# Patient Record
Sex: MALE | Race: BLACK OR AFRICAN AMERICAN | NOT HISPANIC OR LATINO
[De-identification: names, ages, dates, MRNs, and addresses within clinical notes are randomized per-mention and may not be internally consistent; named-entity substitution may affect disease eponyms.]

---

## 2022-06-21 PROBLEM — Z00.00 ENCOUNTER FOR PREVENTIVE HEALTH EXAMINATION: Status: ACTIVE | Noted: 2022-06-21

## 2022-06-22 ENCOUNTER — APPOINTMENT (OUTPATIENT)
Dept: ORTHOPEDIC SURGERY | Facility: CLINIC | Age: 42
End: 2022-06-22
Payer: COMMERCIAL

## 2022-06-22 VITALS — BODY MASS INDEX: 23.1 KG/M2 | HEIGHT: 71 IN | WEIGHT: 165 LBS

## 2022-06-22 DIAGNOSIS — Z78.9 OTHER SPECIFIED HEALTH STATUS: ICD-10-CM

## 2022-06-22 PROCEDURE — 73140 X-RAY EXAM OF FINGER(S): CPT | Mod: RT

## 2022-06-22 PROCEDURE — 99204 OFFICE O/P NEW MOD 45 MIN: CPT

## 2022-06-22 NOTE — IMAGING
[de-identified] : RIGHT HAND\par skin intact. moderate swelling of SF DIPJ.\par TTP to SF DIPJ.\par SF: DIPJ ext lag, otherwise good ext. mild stiffness with PIPJ flex.\par good flex/ext other digits. \par SILT to median, ulnar, radial distribution. \par brisk cap refill all digits.\par no triggering. [Right] : right fingers [FreeTextEntry9] : small: displaced fx of dorsal base of distal phalanx. DIPJ volar subluxation.

## 2022-06-22 NOTE — ASSESSMENT
[FreeTextEntry1] : The condition was explained to the patient. Discussed risks and benefits of surgical vs non-surgical treatment. He would like to proceed with non-surgical treatment.\par Discussed that there will be a permanent bump/deformity at the fracture site. Discussed risk of post-traumatic arthritis, which can cause pain, stiffness, weakness.\par Discussed possible outcomes - extension to neutral, no improvement in extensor lag, or partial improvement in extensor lag (most likely). Discussed risk of skin irritation, breakdown, and sensitivity as well as joint stiffness and loss of ROM due to prolonged immobilization.\par - Applied stack splint (size 5). Anticipate 6wks full time, 4wks part time. Reviewed splint care and hygiene tips. Demonstrated PIPJ flexion exercises to reduce adjacent joint stiffness.\par - Light activity.\par - Recommend OTC pain medications such as Tylenol and NSAIDs as needed, provided there are no contra-indicated medical conditions (eg liver disease, kidney disease, or GI ulcer/bleeding) or medications (eg blood thinners). Discussed possible GI and blood pressure side effects.\par \par F/u 2wks. X-rays R small finger.

## 2022-06-22 NOTE — HISTORY OF PRESENT ILLNESS
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 He has noticed improvement in drooping of finger with just a few days of splinting.\par \par Hx: none. [] : Post Surgical Visit: no [FreeTextEntry1] : Rt pinky [FreeTextEntry5] : patient slammed a door on his pinky 3 weeks ago\par  [de-identified] : movement

## 2022-07-07 ENCOUNTER — APPOINTMENT (OUTPATIENT)
Dept: ORTHOPEDIC SURGERY | Facility: CLINIC | Age: 42
End: 2022-07-07

## 2022-07-07 DIAGNOSIS — S62.636A DISPLACED FRACTURE OF DISTAL PHALANX OF RIGHT LITTLE FINGER, INITIAL ENCOUNTER FOR CLOSED FRACTURE: ICD-10-CM

## 2022-07-07 PROCEDURE — 73140 X-RAY EXAM OF FINGER(S): CPT | Mod: RT

## 2022-07-07 PROCEDURE — 99213 OFFICE O/P EST LOW 20 MIN: CPT

## 2022-07-07 NOTE — IMAGING
[de-identified] : RIGHT HAND\par skin intact. moderate swelling of SF DIPJ.\par TTP to SF DIPJ.\par SF: DIPJ ext lag. good PIP flex/ext.\par good flex/ext other digits. \par SILT to median, ulnar, radial distribution. \par brisk cap refill all digits.\par no triggering. [Right] : right fingers [FreeTextEntry9] : small: displaced fx of dorsal base of distal phalanx. DIPJ volar subluxation.

## 2022-07-07 NOTE — ASSESSMENT
[FreeTextEntry1] : - continue stack splint (size 3). Reviewed splint care and hygiene tips. Demonstrated PIPJ flexion exercises to reduce adjacent joint stiffness.\par - Light activity.\par \par F/u 4wks. X-rays R small finger in splint. Statement Selected

## 2022-07-07 NOTE — HISTORY OF PRESENT ILLNESS
[4] : 4 [1] : 2 [de-identified] : 7/7/22: f/u RIGHT small finger mallet fx. 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 He has noticed improvement in drooping of finger with just a few days of splinting.\par \par Hx: none. [FreeTextEntry1] : rt pinky  [de-identified] : none

## 2022-08-04 ENCOUNTER — APPOINTMENT (OUTPATIENT)
Dept: ORTHOPEDIC SURGERY | Facility: CLINIC | Age: 42
End: 2022-08-04

## 2022-10-30 ENCOUNTER — EMERGENCY (EMERGENCY)
Facility: HOSPITAL | Age: 42
LOS: 0 days | Discharge: HOME | End: 2022-10-31
Attending: STUDENT IN AN ORGANIZED HEALTH CARE EDUCATION/TRAINING PROGRAM
Payer: COMMERCIAL

## 2022-10-30 VITALS
TEMPERATURE: 98 F | DIASTOLIC BLOOD PRESSURE: 61 MMHG | HEART RATE: 72 BPM | RESPIRATION RATE: 18 BRPM | WEIGHT: 169.98 LBS | HEIGHT: 71 IN | OXYGEN SATURATION: 98 % | SYSTOLIC BLOOD PRESSURE: 103 MMHG

## 2022-10-30 VITALS
HEART RATE: 85 BPM | DIASTOLIC BLOOD PRESSURE: 73 MMHG | SYSTOLIC BLOOD PRESSURE: 110 MMHG | RESPIRATION RATE: 18 BRPM | OXYGEN SATURATION: 97 % | TEMPERATURE: 98 F

## 2022-10-30 DIAGNOSIS — S69.92XA UNSPECIFIED INJURY OF LEFT WRIST, HAND AND FINGER(S), INITIAL ENCOUNTER: ICD-10-CM
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
## 2022-10-30 DIAGNOSIS — S62.623A DISPLACED FRACTURE OF MIDDLE PHALANX OF LEFT MIDDLE FINGER, INITIAL ENCOUNTER FOR CLOSED FRACTURE: ICD-10-CM
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
## 2022-10-30 PROCEDURE — 29130 APPL FINGER SPLINT STATIC: CPT

## 2022-10-30 PROCEDURE — 99284 EMERGENCY DEPT VISIT MOD MDM: CPT | Mod: 25

## 2022-10-30 NOTE — ED ADULT TRIAGE NOTE - CHIEF COMPLAINT QUOTE
Patient fell onto his right hand yesterday injuring the 3rd finger on right hand. Denies hitting head. No pmh.

## 2022-10-31 PROCEDURE — 73130 X-RAY EXAM OF HAND: CPT | Mod: 26,LT

## 2022-10-31 NOTE — ED ADULT NURSE NOTE - OBJECTIVE STATEMENT
Received pt in the ED from triage, aox3. C/o L hand pain . Pt endorsed he slipped and felt landing on left hand/ finger today. Pt denied LOC, head strike.  Pt noted with mild swelling on 3rd digit with some pain. No other injury noted. Speaks full sentences, unlabored breathing on RA. Able to CANTU.

## 2022-10-31 NOTE — ED PROVIDER NOTE - OBJECTIVE STATEMENT
43yo male with no pmh presenting with finger injury.  Patient slipped and felt landing on left hand/ finger.  No head strike/ loc.  No pain or injury elsewhere.  Happened earlier today.  No numbness, weakness.  Has some swelling 3rd digit with pain and no skin break.

## 2022-10-31 NOTE — ED ADULT NURSE NOTE - PRO INTERPRETER NEED 2
BLOOD PRESSURE LOG FOR: Karen Stiles Austen  Goal /80 or less.  Don't panic if 140/90.     DATE/TIME  AM WEIGHT BLOOD  PRESSURE PULSE TIME  PM BLOOD  PRESSURE   PULSE                                                                                                                                                                                                                                        Current Blood Pressure Medications: (dose and frequency)    MEDICATION DOSE FREQUENCY   Lisinopril / hctz 20/12.5 daily                    Please vitor any medication change (increase/decrease/new med) with a *.   English

## 2022-10-31 NOTE — ED PROVIDER NOTE - NSFOLLOWUPINSTRUCTIONS_ED_ALL_ED_FT
Follow up with hand specialist - keep splint in place  Rest, drink plenty of fluids  Advance activity as tolerated  Continue all previously prescribed medications as directed  Follow up with your PMD - bring copies of your results  Return to the ER for severe pain, numbness, weakness, or other new or concerning symptoms

## 2022-10-31 NOTE — ED PROVIDER NOTE - PHYSICAL EXAMINATION
General appearance: Nontoxic appearing, conversant, afebrile    HENT: Atraumatic; anicteric sclerae, CLIFF, EOMI   Neck: Trachea midline; Full range of motion, supple   Pulm: normal respiratory effort and no intercostal retractions, normal work of breathing   Extremities: No peripheral edema, no gross deformities, FROM x4, 5/5 MS x4, gross sensation intact, L middle finger edema ttp, flexion/extension intact   Skin: Dry, normal temperature, turgor and texture; no rash   Psych: Appropriate affect, cooperative

## 2022-10-31 NOTE — ED PROVIDER NOTE - CLINICAL SUMMARY MEDICAL DECISION MAKING FREE TEXT BOX
Presenting with finger injury.  No obvious deformities but with edema.  Will XR eval for fx, likely splint, dc.

## 2022-10-31 NOTE — ED PROVIDER NOTE - CARE PROVIDER_API CALL
Avril Montes)  Orthopaedic Surgery; Surgery of the Hand  [address] [place] [address]  Phone: [phone]  Fax: [phone]  Follow Up Time:     Anthony Mcdonnell)  Plastic Surgery  [address] [place] [address], [address]  [place], [place] [id number]  Phone: [phone]  Fax: [phone]  Follow Up Time:

## 2022-10-31 NOTE — ED ADULT NURSE NOTE - NS PRO PASSIVE SMOKE EXP
No Stroke Warning Signs and Symptoms/Prescribed Medications/Call 911 for Stroke/Stroke Education Booklet/Risk Factors for Stroke/Need for Followup After Discharge

## 2022-10-31 NOTE — ED PROVIDER NOTE - PATIENT PORTAL LINK FT
You can access the FollowMyHealth Patient Portal offered by [facility] by registering at the following website: [email or web address]. By joining [facility]’s FollowMyHealth portal, you will also be able to view your health information using other applications (apps) compatible with our system.

## 2022-11-08 ENCOUNTER — APPOINTMENT (OUTPATIENT)
Dept: ORTHOPEDIC SURGERY | Facility: CLINIC | Age: 42
End: 2022-11-08
Payer: COMMERCIAL

## 2022-11-08 VITALS — BODY MASS INDEX: 23.1 KG/M2 | WEIGHT: 165 LBS | HEIGHT: 71 IN

## 2022-11-08 DIAGNOSIS — S62.623A DISPLACED FRACTURE OF MIDDLE PHALANX OF LEFT MIDDLE FINGER, INITIAL ENCOUNTER FOR CLOSED FRACTURE: ICD-10-CM

## 2022-11-08 PROCEDURE — 99214 OFFICE O/P EST MOD 30 MIN: CPT

## 2022-11-08 PROCEDURE — 73140 X-RAY EXAM OF FINGER(S): CPT | Mod: LT

## 2022-11-08 PROCEDURE — 99204 OFFICE O/P NEW MOD 45 MIN: CPT

## 2022-11-08 NOTE — HISTORY OF PRESENT ILLNESS
[8] : 8 [0] : 0 [Sharp] : sharp [Occasional] : occasional [Household chores] : household chores [Leisure] : leisure [de-identified] : 42 year old male presenting with a LEFT middle finger fx. He reports falling down the stairs. Was seen at ER for xrays and advised of a fx and splinted. \par DOI: 10/29/22  [] : no [FreeTextEntry1] : Left hand/left middle finger [FreeTextEntry3] : 10/29/2022 [FreeTextEntry5] : DANNA CELAYA is a 42 year old M here for an evaluation of left middle finger pain. Pt states that he fell down the stairs, and pt states he went to a [facility] for it where he received xrays. Pt had a left over brace from previously injured finger and used that to brace his  left middle finger. Finger is very painful to the touch, and when squeezing objects. [de-identified] : Touch, gripping [de-identified] : 10/31/2022 [de-identified] : [facility] [de-identified] : xray

## 2022-11-08 NOTE — DISCUSSION/SUMMARY
[Surgical risks reviewed] : Surgical risks reviewed [de-identified] : Discussed current alignemnt regarding the appearance of the fingeras well as the possible loss of flexion due to the current alignment.  Discussed time paoseed since the injury.  Recommend operative treatment.  Patinet will be schedule for Closed reduction pinning, possible ORIF LEFT middle finger middle phalanx\par \par The risks of the procedure, including but not limited to infection, bleeding, anesthetic complication, neurovascular injury and the possibility of subsequent surgery were discussed; the benefits, non-operative alternatives and expectations of the proposed procedure were also discussed. Post-operative management, the procedure itself and the expected recovery period were discussed at length with the patient. The need for post-operative physical therapy and home exercise regimen, possible bracing and medication management were also discussed. \par

## 2022-11-08 NOTE — PHYSICAL EXAM
[Left] : left fingers [Fracture] : Fracture [FreeTextEntry9] : displaced articular oblique middle phalanx fx, lt middle finger

## 2022-11-16 ENCOUNTER — APPOINTMENT (OUTPATIENT)
Dept: ORTHOPEDIC SURGERY | Facility: AMBULATORY MEDICAL SERVICES | Age: 42
End: 2022-11-16

## 2022-11-16 PROCEDURE — 26727 TREAT FINGER FRACTURE EACH: CPT | Mod: F2

## 2022-11-29 ENCOUNTER — APPOINTMENT (OUTPATIENT)
Dept: ORTHOPEDIC SURGERY | Facility: CLINIC | Age: 42
End: 2022-11-29

## 2022-11-29 VITALS — HEIGHT: 71 IN | BODY MASS INDEX: 23.1 KG/M2 | WEIGHT: 165 LBS

## 2022-11-29 PROCEDURE — 99024 POSTOP FOLLOW-UP VISIT: CPT

## 2022-11-29 PROCEDURE — 73140 X-RAY EXAM OF FINGER(S): CPT | Mod: LT

## 2022-11-29 NOTE — HISTORY OF PRESENT ILLNESS
[1] : 2 [0] : 0 [] : Post Surgical Visit: yes [de-identified] : 15 s/p pinning of left middle finger  [de-identified] : 11/16/22

## 2022-12-13 ENCOUNTER — APPOINTMENT (OUTPATIENT)
Dept: ORTHOPEDIC SURGERY | Facility: CLINIC | Age: 42
End: 2022-12-13

## 2022-12-13 ENCOUNTER — NON-APPOINTMENT (OUTPATIENT)
Age: 42
End: 2022-12-13

## 2022-12-13 VITALS — BODY MASS INDEX: 23.1 KG/M2 | WEIGHT: 165 LBS | HEIGHT: 71 IN

## 2022-12-13 PROCEDURE — 99024 POSTOP FOLLOW-UP VISIT: CPT

## 2022-12-13 PROCEDURE — 73140 X-RAY EXAM OF FINGER(S): CPT | Mod: LT

## 2022-12-13 NOTE — PHYSICAL EXAM
[Left] : left fingers [No loss of surgical correlation. Bony alignment acceptable. Hardware in appropriate position] : No loss of surgical correlation. Bony alignment acceptable. Hardware in appropriate position [The fracture is in acceptable alignment. There is progression in healing seen] : The fracture is in acceptable alignment. There is progression in healing seen

## 2022-12-13 NOTE — HISTORY OF PRESENT ILLNESS
[0] : 0 [Not working due to injury] : Work status: not working due to injury [de-identified] : 42 year old male presenting 4 weeks s/p closed reduction pinning left middle finger middle phalanx.\par DOS: 11/16/22  [] : This patient has had an injection before: no [FreeTextEntry1] : L middle finger [FreeTextEntry3] : 11/16/22 [FreeTextEntry5] : 41 Y/O RHD M eval L middle finger S/P  CRP on above DOS pt denies any pain toay  [de-identified] : None [de-identified] : 11/16/22 [de-identified] : L middle finger CRP

## 2023-01-03 ENCOUNTER — APPOINTMENT (OUTPATIENT)
Dept: ORTHOPEDIC SURGERY | Facility: CLINIC | Age: 43
End: 2023-01-03
Payer: COMMERCIAL

## 2023-01-03 ENCOUNTER — NON-APPOINTMENT (OUTPATIENT)
Age: 43
End: 2023-01-03

## 2023-01-03 VITALS — WEIGHT: 165 LBS | HEIGHT: 71 IN | BODY MASS INDEX: 23.1 KG/M2

## 2023-01-03 PROCEDURE — 99024 POSTOP FOLLOW-UP VISIT: CPT

## 2023-01-03 NOTE — PHYSICAL EXAM
[Left] : left hand [] : no erythema [FreeTextEntry9] : slight swan neck deformity, 4 cm to DPC, PIP flexes to 80 on the LT, 120 on the RT

## 2023-01-03 NOTE — HISTORY OF PRESENT ILLNESS
Continue with dose of levothyroxine what patient is taking.  Update the med list too    [0] : 0 [Not working due to injury] : Work status: not working due to injury [de-identified] : 42 year old male presenting 7 weeks s/p s/p closed reduction pinning left middle finger middle phalanx. Pins removed at the last visit. He has started OT.  OOW. \par DOS: 11/16/22 \par  [] : This patient has had an injection before: no [FreeTextEntry1] : L middle finger [FreeTextEntry3] : 11/16/22 [FreeTextEntry5] : 43 Y/O RHD M eval L middle finger S/P  CRP on above DOS pt states recovery is going well and denies any pain  [de-identified] : None [de-identified] : 11/16/22 [de-identified] : L middle finger CRP

## 2023-01-31 ENCOUNTER — NON-APPOINTMENT (OUTPATIENT)
Age: 43
End: 2023-01-31

## 2023-01-31 ENCOUNTER — APPOINTMENT (OUTPATIENT)
Dept: ORTHOPEDIC SURGERY | Facility: CLINIC | Age: 43
End: 2023-01-31
Payer: COMMERCIAL

## 2023-01-31 VITALS — WEIGHT: 165 LBS | BODY MASS INDEX: 23.1 KG/M2 | HEIGHT: 71 IN

## 2023-01-31 PROCEDURE — 99024 POSTOP FOLLOW-UP VISIT: CPT

## 2023-01-31 NOTE — DISCUSSION/SUMMARY
[de-identified] : He will continue with OT. ROM \par He will RTW, full duty 2/6/23. \par RTO 6 weeks \par

## 2023-01-31 NOTE — HISTORY OF PRESENT ILLNESS
[0] : 0 [Light duty] : Work status: light duty [de-identified] : 42 year old male presenting 2.5 months s/p s/p closed reduction pinning left middle finger middle phalanx. Pins removed at the last visit. He has been attending OT. Working light duty.  \par DOS: 11/16/22  [] : This patient has had an injection before: no [FreeTextEntry1] : L middle finger [FreeTextEntry3] : 11/16/22 [FreeTextEntry5] : 41 Y/O RHD M eval L middle finger S/P  CRP on above DOS pt states recovery is going well and denies any pain  [de-identified] : None [de-identified] : [employer]  [de-identified] : 11/16/22 [de-identified] : L middle finger CRP

## 2023-03-14 ENCOUNTER — APPOINTMENT (OUTPATIENT)
Dept: ORTHOPEDIC SURGERY | Facility: CLINIC | Age: 43
End: 2023-03-14

## 2023-03-21 ENCOUNTER — APPOINTMENT (OUTPATIENT)
Dept: ORTHOPEDIC SURGERY | Facility: CLINIC | Age: 43
End: 2023-03-21
Payer: COMMERCIAL

## 2023-03-21 VITALS — WEIGHT: 165 LBS | BODY MASS INDEX: 23.1 KG/M2 | HEIGHT: 71 IN

## 2023-03-21 DIAGNOSIS — S62.623D DISPLACED FRACTURE OF MIDDLE PHALANX OF LEFT MIDDLE FINGER, SUBSEQUENT ENCOUNTER FOR FRACTURE WITH ROUTINE HEALING: ICD-10-CM

## 2023-03-21 PROCEDURE — 99213 OFFICE O/P EST LOW 20 MIN: CPT

## 2023-03-21 PROCEDURE — 73140 X-RAY EXAM OF FINGER(S): CPT | Mod: LT

## 2023-03-21 NOTE — PHYSICAL EXAM
[3rd] : 3rd [PIP Joint] : PIP joint [Left] : left fingers [FreeTextEntry3] : SLight swan neck of affected finger.   [FreeTextEntry9] : No change in alignment, union is evident

## 2023-03-21 NOTE — HISTORY OF PRESENT ILLNESS
[0] : 0 [Light duty] : Work status: light duty [de-identified] : 42 year old male presenting 4 months s/p s/p closed reduction pinning left middle finger middle phalanx. He has been attending OT. Working full duty. He is not having pain, but reports stiffness. \par DOS: 11/16/22  [] : This patient has had an injection before: no [FreeTextEntry3] : 11/16/22 [FreeTextEntry1] : L middle finger [FreeTextEntry5] : 41 Y/O RHD M eval L middle finger S/P  CRP on above DOS pt states recovery is going well and denies any pain  [de-identified] : None [de-identified] : [employer]

## 2023-03-21 NOTE — DISCUSSION/SUMMARY
[de-identified] : Discussed the nature of the diagnosis and risk and benefits of different modalities of treatment.\par Xrays reviewed. \par Discussed that at this stage, his finger may not get more motion.   HE states he is able to work and is without pain\par Mauro chose to d/c OT. \par RTO 2 months.

## 2023-05-23 ENCOUNTER — APPOINTMENT (OUTPATIENT)
Dept: ORTHOPEDIC SURGERY | Facility: CLINIC | Age: 43
End: 2023-05-23

## 2023-08-20 ENCOUNTER — EMERGENCY (EMERGENCY)
Facility: HOSPITAL | Age: 43
LOS: 0 days | Discharge: HOME | End: 2023-08-21
Attending: STUDENT IN AN ORGANIZED HEALTH CARE EDUCATION/TRAINING PROGRAM
Payer: COMMERCIAL

## 2023-08-20 VITALS
DIASTOLIC BLOOD PRESSURE: 62 MMHG | SYSTOLIC BLOOD PRESSURE: 125 MMHG | OXYGEN SATURATION: 96 % | HEIGHT: 70 IN | TEMPERATURE: 99 F | RESPIRATION RATE: 15 BRPM | HEART RATE: 83 BPM | WEIGHT: 164.91 LBS

## 2023-08-20 VITALS
DIASTOLIC BLOOD PRESSURE: 65 MMHG | HEART RATE: 56 BPM | TEMPERATURE: 98 F | RESPIRATION RATE: 18 BRPM | SYSTOLIC BLOOD PRESSURE: 112 MMHG | OXYGEN SATURATION: 98 %

## 2023-08-20 DIAGNOSIS — K13.79 OTHER LESIONS OF ORAL MUCOSA: ICD-10-CM
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
## 2023-08-20 DIAGNOSIS — S01.511A LACERATION WITHOUT FOREIGN BODY OF LIP, INITIAL ENCOUNTER: ICD-10-CM
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
## 2023-08-20 PROCEDURE — 99284 EMERGENCY DEPT VISIT MOD MDM: CPT

## 2023-08-20 RX ORDER — METHOCARBAMOL 500 MG/1
1500 TABLET, FILM COATED ORAL ONCE
Refills: 0 | Status: COMPLETED | OUTPATIENT
Start: 2023-08-20 | End: 2023-08-20

## 2023-08-20 RX ORDER — KETOROLAC TROMETHAMINE 30 MG/ML
15 SYRINGE (ML) INJECTION ONCE
Refills: 0 | Status: DISCONTINUED | OUTPATIENT
Start: 2023-08-20 | End: 2023-08-20

## 2023-08-20 RX ORDER — ACETAMINOPHEN 500 MG
650 TABLET ORAL ONCE
Refills: 0 | Status: COMPLETED | OUTPATIENT
Start: 2023-08-20 | End: 2023-08-20

## 2023-08-20 RX ADMIN — METHOCARBAMOL 1500 MILLIGRAM(S): 500 TABLET, FILM COATED ORAL at 22:31

## 2023-08-20 RX ADMIN — Medication 650 MILLIGRAM(S): at 22:31
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
## 2023-08-20 NOTE — ED ADULT NURSE NOTE - NSFALLUNIVINTERV_ED_ALL_ED
Bed/Stretcher in lowest position, wheels locked, appropriate side rails in place/Call bell, personal items and telephone in reach/Instruct patient to call for assistance before getting out of bed/chair/stretcher/Non-slip footwear applied when patient is off stretcher/[place] to call system/Physically safe environment - no spills, clutter or unnecessary equipment/Purposeful proactive rounding/Room/bathroom lighting operational, light cord in reach
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
## 2023-08-20 NOTE — ED ADULT NURSE NOTE - NSFALLOOBATTEMPT_ED_ALL_ED
Haresh Montes is here today for Follow-up    Concerns/symptoms: neck pain and shaking  Medications: medications verified and updated  Refills needed today? No     Tobacco history: verified  Advanced Directives: No not on file, not interested.  BP greater than 140/90? No    Patient would like communication of their results via:    Home Phone: [phone] (home)  Okay to leave a message containing results? Yes  Preferred language:  English.    Health Maintenance Due   Topic Date Due   • Diabetes Eye Exam  Never done   • Diabetes Foot Exam  Never done   • COVID-19 Vaccine (3 - Pfizer risk series) 04/24/2021   • Medicare Advantage- Medicare Wellness Visit  01/01/2023   • Diabetes A1C  05/23/2023      Patient is due for the topics as listed above and wishes to proceed with them. Orders placed for Diabetes A1C and MWV (Medicare Wellness Visit)..    Medicare HRA:  6 a.) How many servings of Fruits and Vegetables do you have each day ( 1 serving = 1 piece of fruit, 1/2 cup fruits or vegetables): 1 per day     6 b.) How many servings of High Fiber / Whole Grain Foods to you have each day ( 1 serving = 1 cup cold cereal, 1/2 cup cooked cereal, 1 slice bread): 1 per day     7b.) Do you feel unsteady when standing or walking?: Yes     7c.) Do you worry about falling?: Yes     8.) During the past 4 weeks, has your physical and emotional health limited your social activities with family, friends, neighbors, or other groups?: Moderately     11d.) Bodily pain: Always     11f.) Feeling stressed or overwhelmed: Always     11h.) Problems with your hearing: Always     15.) How confident are you that you can control and manage most of your health problems?: Somewhat confident           PHQ 2:  Date Adult PHQ 2 Score Adult PHQ 2 Interpretation   4/19/2023 2 No further screening needed       PHQ 9:  Date Adult PHQ 9 Score Adult PHQ 9 Interpretation   3/8/2022 9 Mild Depression        No

## 2023-08-20 NOTE — ED PROVIDER NOTE - PATIENT PORTAL LINK FT
You can access the FollowMyHealth Patient Portal offered by [facility] by registering at the following website: [email or web address]. By joining [facility]’s FollowMyHealth portal, you will also be able to view your health information using other applications (apps) compatible with our system.
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
## 2023-08-20 NOTE — ED PROVIDER NOTE - PHYSICAL EXAMINATION
General: No acute distress, mentation at baseline,  well nourished, well developed  HEENT: 2 1 cm intraoral lacerations on lower lip mucsola, 1 punctate laceration under lower lip w no active bleeding from any foreign body, no dental TTP, Neck supple without meningismus, PERRL, no conjunctival injection  Lungs: CTAB, No wheeze or crackles, No retractions, No increased work of breathing  Heart: S1S2 RRR, No M/R/G, Pules equal Bilaterally in upper and lower extremities distally  Abd: soft, NT/ND, No guarding, No rebound.  No hernias, no palpable masses.  Extrem: FROM in all joints, no gross deformities appreciated, no significant edema noted, No ulcers. Cap refil < 2sec.  Skin: No rash noted, warm dry.  Neuro:  Grossly normal.  No difficulty ambulating. No focal deficits.  Psychiatric: Appropriate mood and affect.
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
## 2023-11-15 NOTE — ED PROVIDER NOTE - WR ORDER NAME 1
Axel  called requesting a refill on the following medications:  Requested Prescriptions     Pending Prescriptions Disp Refills    hydrOXYzine HCl (ATARAX) 25 MG tablet 90 tablet 3     Sig: Take 1 tablet by mouth every 8 hours as needed for Itching       Date of last visit: 6/6/2023  Date of next visit (if applicable):12/7/2023  Date of last refill: 10/25/2022  Pharmacy Name: [facility]of[facility] MA Xray Hand 3 Views, Left
